# Patient Record
(demographics unavailable — no encounter records)

---

## 2024-11-04 NOTE — CONSULT LETTER
[Dear  ___] : Dear  [unfilled], [Consult Letter:] : I had the pleasure of evaluating your patient, [unfilled]. [Please see my note below.] : Please see my note below. [Consult Closing:] : Thank you very much for allowing me to participate in the care of this patient.  If you have any questions, please do not hesitate to contact me. [Sincerely,] : Sincerely, [FreeTextEntry3] : Earl Llamas MD MS [facility]

## 2024-11-04 NOTE — HISTORY OF PRESENT ILLNESS
Speaking Coherently
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  He has history of chronic poor weight gain, with low appetite, early satiety and a high degree of food selectivity.  He has consistently gained weight along the 1st percentile but has been unable to achieve catch up weight gain.  He often prefers lower calorie snacks - chips, cheese its, compared to full meals.  He has low appetite throughout the day often feeling hungriest right after school, then just eating snacks at that time.  He will take a few bites and often stop eating, but he says he would eat more when it is his prefered foods of pasta with a sauce or chicken or steak.  No dysphagia, nausea, vomiting, diarrhea, constipation reported.

## 2024-11-04 NOTE — ASSESSMENT
[FreeTextEntry1] : Cullen is a 13 year old male with [place] syndrome, growth failure on GH replacement and poor weight gain.  Discussed lifestyle / dietary modifications to try to increase calories including always having access to his most preferred foods.  Cyproheptadine for appetite stimulation and possibly delaying onset of satiety is an option as well.  Parents will let me know if they are interested in medication.  Can repeat celiac screening with next labs.

## 2024-12-10 NOTE — PAST MEDICAL HISTORY
[Age Appropriate] : age appropriate developmental milestones not met [FreeTextEntry1] : 9 lb, 20-21 in [FreeTextEntry4] :  jaundice, readmitted for phototherapy and transferred to [facility] - evaluation was normal.

## 2024-12-10 NOTE — HISTORY OF PRESENT ILLNESS
[Headaches] : no headaches [Visual Symptoms] : no ~T visual symptoms [Polyuria] : no polyuria [Polydipsia] : no polydipsia [Knee Pain] : no knee pain [Hip Pain] : no hip pain [Constipation] : no constipation [Fatigue] : no fatigue [Anorexia] : no anorexia [Abdominal Pain] : no abdominal pain [Nausea] : no nausea [Vomiting] : no vomiting [FreeTextEntry2] : Cullen is a 14 year old boy with Renetta syndrome here for continued evaluation of his growth and management of his growth hormone treatment. He was seen initially by me in 2/2020. He has a history of global developmental delay and cryptorchidism. A growth curve had shown height mostly at the 3-5% with decline to <3% between 8 and 9 years; weight had been mostly <3% since 6 years of age. He has a history of poor appetite and weight gain, was seen by GI and had feeding therapy. A bone age done at a CA of 5.5 years was read by radiology as 3 years.  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  At his initial visit with me height was at the 2%, BMI was borderline underweight at the 2%; he had some dysmorphic features and was advised to see genetics. Testing showed a mildly elevated ALT, positive anti-gliadin IgA. low alkP, low to low normal IGF-1 and IGFBP-3 (thought to likely be due to nutritional status).  Cullen was seen by GI - suspicion for celiac disease was low and genetic testing did not show the associated HLA-DQ variants. 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 Pathogenic mutations in PTPN11 are associated with Renetta syndrome. He was seen by cardiology and evaluation was overall normal - follow up in 1 year was advised. I read his bone age as closest to 8 years at a CA of 9 years 2 months, therefore mildly delayed. Most recent bone age was 9 years and so ~ 1 year delayed. He was started on growth hormone in 3/2021. He was last seen in 7/2024 at which time growth velocity was 8.5 cm/yr and GH dose was continued at 1.8 mg daily; BMI was in the underweight range and he was advised to follow up with nutrition.   Cullen returns today for follow up of his growth in height, currently on growth hormone for short stature with [place] syndrome. His mother reports that he has been healthy in the interim. He is taking genotropin 1.8 mg daily (0.35 mg/kg/wk) without missed doses. The injections are given by his parents in his buttocks. He was seen by GI who advised that he be retested for celiac disease.

## 2024-12-10 NOTE — FAMILY HISTORY
[FreeTextEntry5] : 14 [FreeTextEntry4] : MGM 65 in, MGF 68-69 in, PGM 64 in, PGF 72 in [FreeTextEntry2] : 13 year old sister, premenarchal, 62 in; 11 year old brother, 60 in

## 2024-12-10 NOTE — HISTORY OF PRESENT ILLNESS
[Headaches] : no headaches [Visual Symptoms] : no ~T visual symptoms [Polyuria] : no polyuria [Polydipsia] : no polydipsia [Knee Pain] : no knee pain [Hip Pain] : no hip pain [Constipation] : no constipation [Fatigue] : no fatigue [Anorexia] : no anorexia [Abdominal Pain] : no abdominal pain [Nausea] : no nausea [Vomiting] : no vomiting [FreeTextEntry2] : Cullen is a 14 year old boy with Renetta syndrome here for continued evaluation of his growth and management of his growth hormone treatment. He was seen initially by me in 2/2020. He has a history of global developmental delay and cryptorchidism. A growth curve had shown height mostly at the 3-5% with decline to <3% between 8 and 9 years; weight had been mostly <3% since 6 years of age. He has a history of poor appetite and weight gain, was seen by GI and had feeding therapy. A bone age done at a CA of 5.5 years was read by radiology as 3 years.  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  At his initial visit with me height was at the 2%, BMI was borderline underweight at the 2%; he had some dysmorphic features and was advised to see genetics. Testing showed a mildly elevated ALT, positive anti-gliadin IgA. low alkP, low to low normal IGF-1 and IGFBP-3 (thought to likely be due to nutritional status).  Cullen was seen by GI - suspicion for celiac disease was low and genetic testing did not show the associated HLA-DQ variants. 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 Pathogenic mutations in PTPN11 are associated with Renetta syndrome. He was seen by cardiology and evaluation was overall normal - follow up in 1 year was advised. I read his bone age as closest to 8 years at a CA of 9 years 2 months, therefore mildly delayed. Most recent bone age was 9 years and so ~ 1 year delayed. He was started on growth hormone in 3/2021. He was last seen in 7/2024 at which time growth velocity was 8.5 cm/yr and GH dose was continued at 1.8 mg daily; BMI was in the underweight range and he was advised to follow up with nutrition.   Cullen returns today for follow up of his growth in height, currently on growth hormone for short stature with [place] syndrome. His mother reports that he has been healthy in the interim. He is taking genotropin 1.8 mg daily (0.35 mg/kg/wk) without missed doses. The injections are given by his parents in his buttocks. He was seen by GI who advised that he be retested for celiac disease.

## 2024-12-10 NOTE — PHYSICAL EXAM
[Murmur] : no murmurs [de-identified] : thin appearing; ears appear low set and prominent; forehead appears broad and chin pointed; low posterior  hairline [de-identified] : wearing glasses [de-identified] : prominent [de-identified] : +pectus  [de-identified] : deferred - exam from last visit

## 2024-12-10 NOTE — PAST MEDICAL HISTORY
[Age Appropriate] : age appropriate developmental milestones not met [FreeTextEntry1] : 9 lb, 20-21 in [FreeTextEntry4] :  jaundice, readmitted for phototherapy and transferred to [facility] - evaluation was normal.

## 2024-12-10 NOTE — ASSESSMENT
[FreeTextEntry1] : 14 year old boy with Charcot Diana Tooth and [place] syndrome with a mutation in the PTPN11 gene. He has a history of global developmental delay, cryptorchidism, and a deceleration of growth in height since ~8-9 years of age and a long standing history of poor weight gain which is most likely due to insufficient caloric intake and he has been seen by GI. His bone age has been mildly delayed and IGF-1/BP-3 were low to low normal. Growth hormone was started in 3/202. Since his last visit 4 months ago he has grown 2.7 cm and gained 2. kg. Height is at the 10%. BMI is still 1%, significantly underweight. Growth velocity is 7.8 cm/yr which is fair. His growth hormone dose will be increased to 2 mg daily. He will follow up with me in 4 months. Laboratory testing will be done following this visit.

## 2024-12-10 NOTE — ASSESSMENT
[FreeTextEntry1] : 14 year old boy with Charcot Diana Tooth and [place] syndrome with a mutation in the PTPN11 gene. He has a history of global developmental delay, cryptorchidism, and a deceleration of growth in height since ~8-9 years of age and a long standing history of poor weight gain which is most likely due to insufficient caloric intake and he has been seen by GI. His bone age has been mildly delayed and IGF-1/BP-3 were low to low normal. Growth hormone was started in 3/202. Since his last visit 4 months ago he has grown 2.7 cm and gained 2. kg. Height is at the 10%. BMI is still 1%, significantly underweight. Growth velocity is 7.8 cm/yr which is fair. His growth hormone dose will be increased to 2 mg daily. He will follow up with me in 4 months. Laboratory testing will be done following this visit.

## 2024-12-10 NOTE — PHYSICAL EXAM
[Murmur] : no murmurs [de-identified] : thin appearing; ears appear low set and prominent; forehead appears broad and chin pointed; low posterior  hairline [de-identified] : wearing glasses [de-identified] : prominent [de-identified] : +pectus  [de-identified] : deferred - exam from last visit

## 2025-03-21 NOTE — CONSULT LETTER
[Dear  ___] : Dear  [unfilled], [Courtesy Letter:] : I had the pleasure of seeing your patient, [unfilled], in my office today. [Please see my note below.] : Please see my note below. [Sincerely,] : Sincerely, [FreeTextEntry3] : Juanita Decker MD

## 2025-03-21 NOTE — FAMILY HISTORY
[___ inches] : [unfilled] inches [FreeTextEntry5] : 14 [FreeTextEntry4] : MGM 65 in, MGF 68-69 in, PGM 64 in, PGF 72 in [FreeTextEntry2] : 13 year old sister, premenarchal, 62 in; 11 year old brother, 60 in

## 2025-03-21 NOTE — PAST MEDICAL HISTORY
[At Term] : at term [Normal Vaginal Route] : by normal vaginal route [None] : there were no delivery complications [Age Appropriate] : age appropriate developmental milestones not met [Speech & Motor Delay] : patient has speech and motor delay  [Physical Therapy] : physical therapy [Occupational Therapy] : occupational therapy [Speech Therapy] : speech therapy [Feeding Therapy] : feeding therapy  [FreeTextEntry1] : 9 lb, 20-21 in [FreeTextEntry4] :  jaundice, readmitted for phototherapy and transferred to [facility] - evaluation was normal.

## 2025-03-21 NOTE — PHYSICAL EXAM
[Interactive] : interactive [Normal Appearance] : normal appearance [Low Set Ears] : low set [Normal S1 and S2] : normal S1 and S2 [Murmur] : no murmurs [Clear to Ausculation Bilaterally] : clear to auscultation bilaterally [Abdomen Soft] : soft [Abdomen Tenderness] : non-tender [3] : was Arthur stage 3 [___] : [unfilled] [Normal] : normal  [de-identified] : thin appearing; ears appear low set and prominent; forehead appears broad and chin pointed; low posterior  hairline [de-identified] : wearing glasses [de-identified] : prominent [de-identified] : +pectus  [de-identified] : deferred - exam from last visit

## 2025-03-21 NOTE — HISTORY OF PRESENT ILLNESS
[Headaches] : no headaches [Visual Symptoms] : no ~T visual symptoms [Polyuria] : no polyuria [Polydipsia] : no polydipsia [Knee Pain] : no knee pain [Hip Pain] : no hip pain [Constipation] : no constipation [Fatigue] : no fatigue [Anorexia] : no anorexia [Abdominal Pain] : no abdominal pain [Nausea] : no nausea [Vomiting] : no vomiting [FreeTextEntry2] : Cullen is a 14 year 4 month old boy with [place] syndrome here for continued evaluation of his growth and management of his growth hormone treatment. He was seen initially by me in 2/2020. He has a history of global developmental delay and cryptorchidism. A growth curve had shown height mostly at the 3-5% with decline to <3% between 8 and 9 years; weight had been mostly <3% since 6 years of age. He has a history of poor appetite and weight gain, was seen by GI and had feeding therapy. A bone age done at a CA of 5.5 years was read by radiology as 3 years.  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  At his initial visit with me height was at the 2%, BMI was borderline underweight at the 2%; he had some dysmorphic features and was advised to see genetics. Testing showed a mildly elevated ALT, positive anti-gliadin IgA. low alkP, low to low normal IGF-1 and IGFBP-3 (thought to likely be due to nutritional status).  Cullen was seen by GI - suspicion for celiac disease was low and genetic testing did not show the associated HLA-DQ variants. 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 Pathogenic mutations in PTPN11 are associated with Renetta syndrome. He was seen by cardiology and evaluation was overall normal - follow up in 1 year was advised. I read his bone age as closest to 8 years at a CA of 9 years 2 months, therefore mildly delayed. Most recent bone age was 9 years and so ~ 1 year delayed. He was started on growth hormone in 3/2021. He was last seen in 12/2024 at which time growth velocity was 7.8 cm/yr and GH dose was increased to 2 mg daily; BMI was in the underweight range and he was advised to follow up with nutrition.   Cullen returns today for follow up of his growth in height, currently on growth hormone for short stature with Renetta syndrome. His mother reports that  . He is taking genotropin 2 mg daily (0.  mg/kg/wk) with  missed doses. The injections are given by his parents in his buttocks.     14 year 4 month old boy with Charcot Diana Tooth and Renetta syndrome with a mutation in the PTPN11 gene. He has a history of global developmental delay, cryptorchidism, and a deceleration of growth in height since ~8-9 years of age and a long standing history of poor weight gain which is most likely due to insufficient caloric intake and he has been seen by GI. His bone age has been mildly delayed and IGF-1/BP-3 were low to low normal. Growth hormone was started in 3/202. Since his last visit 4 months ago he has grown   cm and  . kg. Height is at the  %. BMI is still  %, significantly underweight. Growth velocity is  cm/yr which is  . His growth hormone dose will  mg daily. He will follow up with me in 4-5 months.

## 2025-04-07 NOTE — ASSESSMENT
[FreeTextEntry1] : 14 year 4 month old boy with Charcot Diana Tooth and Renetta syndrome with a mutation in the PTPN11 gene. He has a history of global developmental delay, cryptorchidism, and a deceleration of growth in height since ~8-9 years of age and a long standing history of poor weight gain which is most likely due to insufficient caloric intake and he has been seen by GI. His bone age has been mildly delayed and IGF-1/BP-3 were low to low normal. Growth hormone was started in 3/202. Since his last visit 4 months ago he has grown 2.7 cm and gained 1 kg. Height is at the 10%. BMI is still 0.5%, significantly underweight. Growth velocity is 7.8 cm/yr which is fair. His growth hormone dose will increased to 2.1 mg daily. He will follow up with me in 4-5 months.

## 2025-04-07 NOTE — PHYSICAL EXAM
[4] : was Arthur stage 4 [Murmur] : no murmurs [de-identified] : thin appearing; ears appear low set and prominent; forehead appears broad and chin pointed; low posterior  hairline [de-identified] : wearing glasses [de-identified] : prominent [de-identified] : +pectus

## 2025-04-07 NOTE — HISTORY OF PRESENT ILLNESS
[Headaches] : no headaches [Visual Symptoms] : no ~T visual symptoms [Polyuria] : no polyuria [Polydipsia] : no polydipsia [Knee Pain] : no knee pain [Hip Pain] : no hip pain [Constipation] : no constipation [Fatigue] : no fatigue [Anorexia] : no anorexia [Abdominal Pain] : no abdominal pain [Nausea] : no nausea [Vomiting] : no vomiting [FreeTextEntry2] : Cullen is a 14 year 4 month old boy with [place] syndrome here for continued evaluation of his growth and management of his growth hormone treatment. He was seen initially by me in 2/2020. He has a history of global developmental delay and cryptorchidism. A growth curve had shown height mostly at the 3-5% with decline to <3% between 8 and 9 years; weight had been mostly <3% since 6 years of age. He has a history of poor appetite and weight gain, was seen by GI and had feeding therapy. A bone age done at a CA of 5.5 years was read by radiology as 3 years.  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  At his initial visit with me height was at the 2%, BMI was borderline underweight at the 2%; he had some dysmorphic features and was advised to see genetics. Testing showed a mildly elevated ALT, positive anti-gliadin IgA. low alkP, low to low normal IGF-1 and IGFBP-3 (thought to likely be due to nutritional status).  Cullen was seen by GI - suspicion for celiac disease was low and genetic testing did not show the associated HLA-DQ variants. 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 Pathogenic mutations in PTPN11 are associated with Renetta syndrome. He was seen by cardiology and evaluation was overall normal - follow up in 1 year was advised. I read his bone age as closest to 8 years at a CA of 9 years 2 months, therefore mildly delayed. Most recent bone age was 9 years and so ~ 1 year delayed. He was started on growth hormone in 3/2021. He was last seen in 12/2024 at which time growth velocity was 7.8 cm/yr and GH dose was increased to 2 mg daily; BMI was in the underweight range and he was advised to follow up with nutrition.   Cullen returns today for follow up of his growth in height, currently on growth hormone for short stature with Renetta syndrome. His father reports that he has been healthy in the interim. He is taking genotropin 2 mg daily (0.384 mg/kg/wk) without missed doses. The injections are given by his parents in his buttocks.

## 2025-04-07 NOTE — PAST MEDICAL HISTORY
[Age Appropriate] : age appropriate developmental milestones not met [FreeTextEntry1] : 9 lb, 20-21 in [FreeTextEntry4] :  jaundice, readmitted for phototherapy and transferred to [facility] - evaluation was normal.